# Patient Record
Sex: FEMALE | Race: WHITE
[De-identification: names, ages, dates, MRNs, and addresses within clinical notes are randomized per-mention and may not be internally consistent; named-entity substitution may affect disease eponyms.]

---

## 2019-04-06 ENCOUNTER — HOSPITAL ENCOUNTER (EMERGENCY)
Dept: HOSPITAL 33 - ED | Age: 54
Discharge: HOME | End: 2019-04-06
Payer: COMMERCIAL

## 2019-04-06 VITALS — DIASTOLIC BLOOD PRESSURE: 83 MMHG | HEART RATE: 78 BPM | SYSTOLIC BLOOD PRESSURE: 140 MMHG | OXYGEN SATURATION: 98 %

## 2019-04-06 DIAGNOSIS — S81.012A: Primary | ICD-10-CM

## 2019-04-06 DIAGNOSIS — S00.81XA: ICD-10-CM
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
## 2019-04-06 PROCEDURE — 73562 X-RAY EXAM OF KNEE 3: CPT

## 2019-04-06 PROCEDURE — 96372 THER/PROPH/DIAG INJ SC/IM: CPT

## 2019-04-06 PROCEDURE — 90715 TDAP VACCINE 7 YRS/> IM: CPT

## 2019-04-06 PROCEDURE — 90471 IMMUNIZATION ADMIN: CPT

## 2019-04-06 PROCEDURE — 12001 RPR S/N/AX/GEN/TRNK 2.5CM/<: CPT

## 2019-04-06 PROCEDURE — 99284 EMERGENCY DEPT VISIT MOD MDM: CPT

## 2019-04-06 NOTE — XRAY
Indication: Laceration.



Comparison: None



3 views of the left knee demonstrates anterior soft tissue laceration, tiny

patellar spurring, minimal medial joint space narrowing, and small tibial

shaft enchondroma.  No other bony, articular, or soft tissue abnormalities.

## 2019-04-06 NOTE — ERPHSYRPT
- History of Present Illness


Time Seen by Provider: 04/06/19 16:58


Source: patient


Exam Limitations: no limitations


Patient Subjective Stated Complaint: tripped on rocks landing on left knee.  

abrasion to chin.


Triage Nursing Assessment: alert and oriented.. states tripped on rocks landing 

on left knee and hitting chin.. no LOC.  laceration over the patella.. noted 

debris in lac with cleaning.  bleeding controlled.  noted small abrasion to her 

chin.  denies loose teeth.


Physician History: 





Pt fell, when fishing, hit her left knee against a rock, sustained a laceration 

to the anterior knee, denies other injury or complaints.


Method of Injury: fell


Occurred: just prior to arrival


Quality: constant


Severity of Pain-Max: moderate


Severity of Pain-Current: moderate


Lower Extremities Pain: knee: left (laceration)


Modifying Factors: Improves With: immobilization, movement


Associated Symptoms: none


Immunizations Up to Date: No





- Review of Systems


Constitutional: No Symptoms


Ears, Nose, & Throat: No Symptoms


Respiratory: No Symptoms


Cardiac: No Symptoms


Abdominal/Gastrointestinal: No Symptoms


Genitourinary Symptoms: No Symptoms


Musculoskeletal: Other (laceration to left anterior knee)


Neurological: No Symptoms


All Other Systems: Reviewed and Negative





- Past Medical History


Pertinent Past Medical History: No





- Past Surgical History


Past Surgical History: No





- Social History


Smoking Status: Never smoker


Exposure to second hand smoke: No


Drug Use: none


Patient Lives Alone: No





- Female History


Hx Pregnant Now: No





- Nursing Vital Signs


Nursing Vital Signs: 


 Initial Vital Signs











Temperature  97.5 F   04/06/19 16:31


 


Pulse Rate  78   04/06/19 16:31


 


Respiratory Rate  18   04/06/19 16:31


 


Blood Pressure  140/83   04/06/19 16:31


 


O2 Sat by Pulse Oximetry  98   04/06/19 16:31








 Pain Scale











Pain Intensity                 4

















- Physical Exam


General Appearance: no apparent distress


Eyes, Ears, Nose, Throat Exam: normal ENT inspection, moist mucous membranes


Neck Exam: normal inspection, non-tender, supple


Cardiovascular/Respiratory Exam: chest non-tender, normal breath sounds, 

regular rate/rhythm, heart sounds normal, no ecchymosis


Gastrointestinal/Abdominal Exam: non-tender, soft


Back Exam: normal inspection, No CVA tenderness, No vertebral tenderness


Hips Exam: left: non-tender


Legs Exam: left leg: non-tender


Knees Exam: left knee: soft tissue tenderness (2.5 cm irregular, deep 

laceration to the anterior knee, kn large hematoma, or deformity, good ROM , no 

effusion or laxity, good distal pulses and sensation.)


Ankle Exam: left ankle: non-tender


Neuro/Tendon Exam: normal sensation, normal motor functions


Mental Status Exam: alert, oriented x 3, cooperative


Skin Exam: normal color, warm, dry


**SpO2 Interpretation**: normal


SpO2: 98


O2 Delivery: Room Air





Procedures





- Laceration/Wound Repair


  ** Left Knee


Wound Location: Left, lower leg


Wound Length (cm): 2.5


Wound's Depth, Shape: into muscle, irregular, contused tissue


Wound Explored: foreign body removed


Irrigated: Yes


Hibiclens Prep: Yes


Anesthesia: local, 1% Lidocaine


Volume Anesthetic (ccs): 8


Wound Debrided: moderate


Wound Repaired With: sutures


Suture Size/Type: 3-0, ethilon


Number of Sutures: 4


Layer Closure?: No


Sterile Dressing Applied?: Yes


Splint Applied?: No


Sling Applied?: No





- Course


Nursing assessment & vital signs reviewed: Yes





- Radiology Exams


  ** Left Knee


X-ray Interpretation: Interpreted by me, Negative


Ordered Tests: 


 Active Orders 24 hr











 Category Date Time Status


 


 Prepare for Sutures STAT Care  04/06/19 17:00 Active


 


 Sutures STAT Care  04/06/19 17:00 Active


 


 Wound Care STAT Care  04/06/19 17:00 Active


 


 KNEE (3 VIEWS) Stat Exams  04/06/19 16:58 Taken








Medication Summary














Discontinued Medications














Generic Name Dose Route Start Last Admin





  Trade Name Freq  PRN Reason Stop Dose Admin


 


Cephalexin HCl  500 mg  04/06/19 18:12  





  Keflex 500 Mg**  PO  04/06/19 18:13  





  STAT ONE   





     





     





     





     


 


Diphtheria/Tetanus/Acell Pertussis  0.5 ml  04/06/19 17:00  04/06/19 17:10





  Adacel Vial***  IM  04/06/19 17:01  0.5 ml





  .ONCE ONE   Administration





     





     





     





     


 


Diphtheria/Tetanus/Acell Pertussis  Confirm  04/06/19 17:06  





  Adacel Vial***  Administered  04/06/19 17:07  





  Dose   





  0.5 ml   





  IM   





  .STK-MED ONE   





     





     





     





     


 


Lidocaine HCl  10 ml  04/06/19 17:00  04/06/19 17:03





  Xylocaine 1% Hcl 20 Ml Mdv***  IJ  04/06/19 17:01  10 ml





  STAT ONE   Administration





     





     





     





     


 


Lidocaine HCl  Confirm  04/06/19 17:01  





  Xylocaine 1% Hcl 20 Ml Mdv***  Administered  04/06/19 17:02  





  Dose   





  10 ml   





  .ROUTE   





  .[facility]-MED ONE   





     





     





     





     














- Progress


Progress: improved


Progress Note: 





04/06/19 18:08


We reviewed her knee  X ray, no foreign body seen, she is being discharged 

after sutures to rest with elevated leg, apply ice to swelling, start Keflex 

500 mg Q6h x 7 days, and follow up with your physician in 2-3 days, removal of 

the sutures after 7 days, return if severe pain, redness, discharge or fever> 

102 F.


Counseled pt/family regarding: diagnosis, need for follow-up, rad results





- Departure


Departure Disposition: Home


Clinical Impression: 


Laceration of knee


Qualifiers:


 Encounter type: initial encounter Laterality: left Qualified Code(s): S81.012A 

- Laceration without foreign body, left knee, initial encounter





Condition: Stable


Critical Care Time: No


Referrals: 


DOCTOR,NO FAMILY [Primary Care Provider] - 


Instructions:  Contusion (DC), Laceration Repair With Stitches (DC)


Additional Instructions: 


Rest x 2-3 days with elevated leg, apply ice to swelling, and follow up with 

your physician in 2-3 days, removal of the sutures after 7 days, return if 

severe pain, swelling, redness, discharge or fever> 102 F!


Prescriptions: 


Cephalexin Mh 500 mg** [Keflex 500 mg**] 500 mg PO Q6H #28 capsule